# Patient Record
Sex: MALE | Race: WHITE | URBAN - METROPOLITAN AREA
[De-identification: names, ages, dates, MRNs, and addresses within clinical notes are randomized per-mention and may not be internally consistent; named-entity substitution may affect disease eponyms.]

---

## 2023-12-04 ENCOUNTER — ORDER TRANSCRIPTION (OUTPATIENT)
Dept: PHYSICAL THERAPY | Facility: HOSPITAL | Age: 68
End: 2023-12-04

## 2023-12-04 DIAGNOSIS — R53.81 PHYSICAL DECONDITIONING: Primary | ICD-10-CM

## 2023-12-05 ENCOUNTER — ORDER TRANSCRIPTION (OUTPATIENT)
Dept: PHYSICAL THERAPY | Facility: HOSPITAL | Age: 68
End: 2023-12-05

## 2023-12-05 DIAGNOSIS — R53.1 WEAKNESS: Primary | ICD-10-CM

## 2023-12-08 ENCOUNTER — TELEPHONE (OUTPATIENT)
Dept: PHYSICAL THERAPY | Facility: HOSPITAL | Age: 68
End: 2023-12-08

## 2023-12-11 ENCOUNTER — OFFICE VISIT (OUTPATIENT)
Dept: PHYSICAL THERAPY | Age: 68
End: 2023-12-11
Attending: NURSE PRACTITIONER
Payer: MEDICARE

## 2023-12-11 DIAGNOSIS — R53.1 WEAKNESS: Primary | ICD-10-CM

## 2023-12-11 PROCEDURE — 97162 PT EVAL MOD COMPLEX 30 MIN: CPT

## 2023-12-11 PROCEDURE — 97110 THERAPEUTIC EXERCISES: CPT

## 2023-12-12 ENCOUNTER — TELEPHONE (OUTPATIENT)
Dept: PHYSICAL THERAPY | Facility: HOSPITAL | Age: 68
End: 2023-12-12

## 2023-12-19 ENCOUNTER — OFFICE VISIT (OUTPATIENT)
Dept: PHYSICAL THERAPY | Age: 68
End: 2023-12-19
Attending: NURSE PRACTITIONER
Payer: MEDICARE

## 2023-12-19 PROCEDURE — 97110 THERAPEUTIC EXERCISES: CPT

## 2023-12-19 NOTE — PROGRESS NOTES
Dx: Weakness (R53.1)      Physical deconditioning (R53.81)          Insurance (Authorized # of Visits):  [employer]   [address] visits: 15   Authorizing Physician: Dr. Yusuf Patterson MD visit:   Fall Risk: standard         Precautions: TEVAR 11/2/23, carotid bypass July 2023, R hematoma armpit           Subjective: Pt reports feeling ok this morning. Pt reports that last week he \"tweeked\" his R ankle stepping in a pothole while he was out for a walk. He reports it's ok now. Pain level: 0/10  Objective: see flow sheet below  Initial vitals: BP: 166/98; after 2 min rest 156/89            HR: 60 bpm            SpO2: 95%  End of session: BP: 167/101                            HR: 68 bpm                            SpO2: 95%  RPE: 2/10    Assessment: Reviewed HEP and provided pt with additional scapular strengthening exercises. Goals:   Pt will be able to walk 2 miles or greater without rest breaks to be able to be independent in the community. Pt will be able to navigate 2 flights of stairs without rest to be able to navigate safely in his home. Pt will be able to lift 10lbs or greater overhead to be able to grab objects from cabinets at home. Pt will be able to carry 10lbs or greater up and down stairs without rest to move items up from the basement for household chores. Pt will increase SLS on L to 20 seconds or greater to be able to don pants safely in standing. Pt will increase mid/low trap strength to 5/5 to be able to stabilize trunk and UE with lifting and reaching for objects overhead. Plan: continue with scapular strengthening and aerobic exercise  Date: 12/19/2023  TX#: 2/12 Date:                 TX#: 3/ Date:                 TX#: 4/ Date:                 TX#: 5/ Date:    Tx#: 6/   There ex:  Nustep L3 x 10 min   Rows red TB 10x3  Ws red TB 10x3  Flex red TB 10x3  Step up w/hip drive 27Z5 R/L                            HEP: 12/19/23- flex red TB, Ws red TB     Charges: TEx3       Total Timed Treatment: 40 min  Total Treatment Time: 40 min

## 2024-01-02 ENCOUNTER — OFFICE VISIT (OUTPATIENT)
Dept: PHYSICAL THERAPY | Age: 69
End: 2024-01-02
Attending: NURSE PRACTITIONER
Payer: MEDICARE

## 2024-01-02 PROCEDURE — 97110 THERAPEUTIC EXERCISES: CPT

## 2024-01-02 NOTE — PROGRESS NOTES
Dx: Weakness (R53.1)      Physical deconditioning (R53.81)          Insurance (Authorized # of Visits):  [employer]   POC visits: 12   Authorizing Physician: Dr. Charles Patterson MD visit:   Fall Risk: standard         Precautions: TEVAR 11/2/23, carotid bypass July 2023, R hematoma armpit           Subjective: Pt reports that has noticed more stamina definitely.   Pain level: 0/10  Objective: see flow sheet below  Initial vitals: BP: 136/81            HR: 56 bpm            SpO2: 96%  End of session: BP: 162/95                            HR: 68 bpm                            SpO2: 95%  RPE: 2/10    Assessment: Progressed scapular and hip strengthening exercises and provided pt with HEP.       Goals:   Pt will be able to walk 2 miles or greater without rest breaks to be able to be independent in the community.  Pt will be able to navigate 2 flights of stairs without rest to be able to navigate safely in his home.   Pt will be able to lift 10lbs or greater overhead to be able to grab objects from cabinets at home.   Pt will be able to carry 10lbs or greater up and down stairs without rest to move items up from the basement for household chores.   Pt will increase SLS on L to 20 seconds or greater to be able to don pants safely in standing.   Pt will increase mid/low trap strength to 5/5 to be able to stabilize trunk and UE with lifting and reaching for objects overhead.     Plan: continue with scapular strengthening and aerobic exercise  Date: 12/19/2023  TX#: 2/12 Date: 1/2/24       TX#: 3/12 Date:                 TX#: 4/ Date:                 TX#: 5/ Date:   Tx#: 6/   There ex:  Nustep L3 x 10 min   Rows red TB 10x3  Ws red TB 10x3  Flex red TB 10x3  Step up w/hip drive 10x2 R/L There ex:  Flex green TB 15x2  B ER green TB 15x2  Horiz abd green TB 15x2   Diagnols green TB 15x R/L  Step up w/hip drive 15x2 R/L  Hip abd red TB 15x2 R/L  Hip ext red TB 15x2 R/L                           HEP: 12/19/23- flex red TB, Ws  red TB   1/2/24- B ER/horiz abd/diagnols- green TB, hip abd/ext    Charges: TEx3       Total Timed Treatment: 40 min  Total Treatment Time: 40 min

## 2024-01-04 ENCOUNTER — OFFICE VISIT (OUTPATIENT)
Dept: PHYSICAL THERAPY | Age: 69
End: 2024-01-04
Attending: NURSE PRACTITIONER
Payer: MEDICARE

## 2024-01-04 PROCEDURE — 97110 THERAPEUTIC EXERCISES: CPT

## 2024-01-04 NOTE — PROGRESS NOTES
Dx: Weakness (R53.1)      Physical deconditioning (R53.81)          Insurance (Authorized # of Visits):  [employer]   POC visits: 12   Authorizing Physician: Dr. Charles Patterson MD visit:   Fall Risk: standard         Precautions: TEVAR 11/2/23, carotid bypass July 2023, R hematoma armpit           Subjective: Pt reports no complaints.  Pt reports that he walked 4 miles yesterday, and was tired but not not SOB. He reports most of his SOB symptoms occur when he has to use quick bursts of energy such as going up the stairs.   Pain level: 0/10  Objective: see flow sheet below  Initial vitals: not taken this session, pt reports BP at home was 140/82 this morning  End of session: BP: 156/92                            HR: 71 bpm                            SpO2: 97%  RPE: 2/10 end of session    Assessment: Initiated exercises on compliant surface to improve postural stability and pulley machine to further progress scapular strength for OH lifting.       Goals:   Pt will be able to walk 2 miles or greater without rest breaks to be able to be independent in the community.  Pt will be able to navigate 2 flights of stairs without rest to be able to navigate safely in his home.   Pt will be able to lift 10lbs or greater overhead to be able to grab objects from cabinets at home.   Pt will be able to carry 10lbs or greater up and down stairs without rest to move items up from the basement for household chores.   Pt will increase SLS on L to 20 seconds or greater to be able to don pants safely in standing.   Pt will increase mid/low trap strength to 5/5 to be able to stabilize trunk and UE with lifting and reaching for objects overhead.     Plan: continue with strengthening to improve activity tolerance  Date: 12/19/2023  TX#: 2/12 Date: 1/2/24       TX#: 3/12 Date:  1/4/24             TX#: 4/12 Date:                 TX#: 5/ Date:   Tx#: 6/   There ex:  Nustep L3 x 10 min   Rows red TB 10x3  Ws red TB 10x3  Flex red TB  10x3  Step up w/hip drive 10x2 R/L There ex:  Flex green TB 15x2  B ER green TB 15x2  Horiz abd green TB 15x2   Diagnols green TB 15x R/L  Step up w/hip drive 15x2 R/L  Hip abd red TB 15x2 R/L  Hip ext red TB 15x2 R/L There ex:  Nustep L3 x 10 min   Hip abd red TB 15x2 R/L  Hip ext red TB 15x2 R/L  Hip flex/knee flex red TB 15x2 R/L  Pulley machine rows 3# 15x2  Pulley machine Ws 3# 15x2       NMRE:  SLS airex w/hip flex/abd/ext 10x R/L                   HEP: 12/19/23- flex red TB, Ws red TB   1/2/24- B ER/horiz abd/diagnols- green TB, hip abd/ext- red TB    Charges: TEx3(40 min)      Total Timed Treatment: 40 min  Total Treatment Time: 40 min

## 2024-01-08 ENCOUNTER — OFFICE VISIT (OUTPATIENT)
Dept: PHYSICAL THERAPY | Age: 69
End: 2024-01-08
Attending: NURSE PRACTITIONER
Payer: MEDICARE

## 2024-01-08 PROCEDURE — 97110 THERAPEUTIC EXERCISES: CPT

## 2024-01-08 NOTE — PROGRESS NOTES
Dx: Weakness (R53.1)      Physical deconditioning (R53.81)          Insurance (Authorized # of Visits):  [employer]   POC visits: 12   Authorizing Physician: Dr. Charles Patterson MD visit:   Fall Risk: standard         Precautions: TEVAR 11/2/23, carotid bypass July 2023, R hematoma armpit           Subjective: Patient reports he is walking 4 miles consistently; he is not tired after 4 miles even after his exercises.  He is planning on increasing to 5 miles.  He did go up and down the stairs a lot of weekend with moving [place] stuff; he gets tired fast with the stairs and this is still relatively tiring.    Pain level: 0/10  Objective: see flow sheet below  Beginning of session: BP: 155/91                            HR: 64 bpm                            SpO2: 96%      End of session: BP: 52416                            HR: 83 bpm                            SpO2: 97%  RPE: 4/10 end of session    Assessment: Continued with LE strengthening and balance as well as UE strength to help manage reaching and prolonged LE use. Also progressed with step ups this session to promote increased functional strength as needed for stairs.  Need to continue with dynamic strengthening to improve endurance for ADLS      Goals:   Pt will be able to walk 2 miles or greater without rest breaks to be able to be independent in the community.  MET  Pt will be able to navigate 2 flights of stairs without rest to be able to navigate safely in his home.   Pt will be able to lift 10lbs or greater overhead to be able to grab objects from cabinets at home.   Pt will be able to carry 10lbs or greater up and down stairs without rest to move items up from the basement for household chores.   Pt will increase SLS on L to 20 seconds or greater to be able to don pants safely in standing.   Pt will increase mid/low trap strength to 5/5 to be able to stabilize trunk and UE with lifting and reaching for objects overhead.     Plan: Continue with  strengthening to improve activity tolerance and endurance; progress with the BB next visit.  Date: 1/2/24       TX#: 3/12 Date:  1/4/24             TX#: 4/12 Date:   1/8/24              TX#: 5/12 Date:   Tx#: 6/   There ex:  Flex green TB 15x2  B ER green TB 15x2  Horiz abd green TB 15x2   Diagnols green TB 15x R/L  Step up w/hip drive 15x2 R/L  Hip abd red TB 15x2 R/L  Hip ext red TB 15x2 R/L There ex:  Nustep L3 x 10 min   Hip abd red TB 15x2 R/L  Hip ext red TB 15x2 R/L  Hip flex/knee flex red TB 15x2 R/L  Pulley machine rows 3# 15x2  Pulley machine Ws 3# 15x2 There ex:  Nustep L4 x 10 min   Hip abd red TB 15x2 R/L  Hip ext red TB 15x2 R/L  Hip flex red TB 15x2 R/L  Pulley machine rows 7# 15x2     Step up with hip flexion x 10 R/L at stairs         NMRE:  SLS airex w/hip flex/abd/ext 10x R/L NMRE:  SLS airex w/hip flex/abd/ext 10x R/L                HEP: 12/19/23- flex red TB, Ws red TB   1/2/24- B ER/horiz abd/diagnols- green TB, hip abd/ext- red TB    Charges: Ther ex x 3 (39 min)      Total Timed Treatment: 41 min  Total Treatment Time: 42 min

## 2024-01-10 ENCOUNTER — OFFICE VISIT (OUTPATIENT)
Dept: PHYSICAL THERAPY | Age: 69
End: 2024-01-10
Attending: NURSE PRACTITIONER
Payer: MEDICARE

## 2024-01-10 PROCEDURE — 97110 THERAPEUTIC EXERCISES: CPT

## 2024-01-10 NOTE — PROGRESS NOTES
Dx: Weakness (R53.1)      Physical deconditioning (R53.81)          Insurance (Authorized # of Visits):  [employer]   POC visits: 12   Authorizing Physician: Dr. Charles Patterson MD visit:   Fall Risk: standard         Precautions: TEVAR 11/2/23, carotid bypass July 2023, R hematoma armpit           Subjective: Patient reports he was not sore after the last session, but he had some fatigue in the hips. He walked 5 miles later that day and he did not have a problem with the progression and he did not need a break. He has less fatigue with repeated reaching overhead.  Pain level: 0/10  Objective: see flow sheet below  Beginning of session: BP: 138/81 - taken at home                            HR:  81 bpm                            SpO2: 95%      End of session: BP: 154/90                            HR: 76 bpm                            SpO2: 98%  RPE: 4/10 end of session    Assessment: Progressed with the BB this visit to help promote increased strength and proprioception as his walking and standing endurances improve. Also progressed with sit to stand reps this session to work on his LE strenght in [facility] and his CV endurance; needs reinforcement.    Goals:   Pt will be able to walk 2 miles or greater without rest breaks to be able to be independent in the community.  MET  Pt will be able to navigate 2 flights of stairs without rest to be able to navigate safely in his home.   Pt will be able to lift 10 lbs or greater overhead to be able to grab objects from cabinets at home.   Pt will be able to carry 10 lbs or greater up and down stairs without rest to move items up from the basement for household chores.   Pt will increase SLS on L to 20 seconds or greater to be able to don pants safely in standing.   Pt will increase mid/low trap strength to 5/5 to be able to stabilize trunk and UE with lifting and reaching for objects overhead.     Plan: Continue with strengthening to improve activity tolerance and endurance;  progress with increased time on the BB next time and add the monster walks to additional functional strength.  Date: 1/2/24       TX#: 3/12 Date:  1/4/24             TX#: 4/12 Date:   1/8/24              TX#: 5/12 Date: 1/10/24  Tx#: 6/12   There ex:  Flex green TB 15x2  B ER green TB 15x2  Horiz abd green TB 15x2   Diagnols green TB 15x R/L  Step up w/hip drive 15x2 R/L  Hip abd red TB 15x2 R/L  Hip ext red TB 15x2 R/L There ex:  Nustep L3 x 10 min   Hip abd red TB 15x2 R/L  Hip ext red TB 15x2 R/L  Hip flex/knee flex red TB 15x2 R/L  Pulley machine rows 3# 15x2  Pulley machine Ws 3# 15x2 There ex:  Nustep L4 x 10 min   Hip abd red TB 15x2 R/L  Hip ext red TB 15x2 R/L  Hip flex red TB 15x2 R/L  Pulley machine rows 7# 15x2     Step up with hip flexion x 10 R/L at stairs     There ex:  Nustep L4 x 10 min   Hip abd red TB 15x2 R/L  Hip ext red TB 15x2 R/L  Hip flex red TB 15x2 R/L  Pulley machine rows 7# 15x2     Step up with hip flexion x 15 R/L at stairs    NMRE:  SLS Airex w/hip flex/abd/ext 10 x R/L NMRE:  SLS Airex w/hip flex/abd/ext 10 x R/L NMRE:  SLS Airex w/hip flex/abd/ext 10 x R/L  BB center balance x 1 min                HEP: 12/19/23- flex red TB, Ws red TB   1/2/24- B ER/horiz abd/diagnols- green TB, hip abd/ext- red TB    Charges: Ther ex x 3 (39 min)      Total Timed Treatment: 42 min  Total Treatment Time: 42 min

## 2024-01-15 ENCOUNTER — OFFICE VISIT (OUTPATIENT)
Dept: PHYSICAL THERAPY | Age: 69
End: 2024-01-15
Attending: NURSE PRACTITIONER
Payer: MEDICARE

## 2024-01-15 PROCEDURE — 97110 THERAPEUTIC EXERCISES: CPT

## 2024-01-15 NOTE — PROGRESS NOTES
Dx: Weakness (R53.1)      Physical deconditioning (R53.81)          Insurance (Authorized # of Visits):  [employer]   POC visits: 12   Authorizing Physician: Dr. Charles Patterson MD visit:   Fall Risk: standard         Precautions: TEVAR 11/2/23, carotid bypass July 2023, R hematoma armpit           Subjective: Patient reports he has ridden the stationary bike a little bit more since the weather has turned colder and he is walking less outside.  He is feeling that he is getting a little bit stronger.  Pain level: 0/10  Objective: see flow sheet below  Beginning of session: BP: 140/83 - taken at home                            HR:  55 bpm                            SpO2: 96%      End of session: BP: 170/100 - after 2 min 153/94                            HR: 74 bpm                            SpO2: 98%  RPE: 3/10 end of session    Assessment: Continued to work on progressive strengthening this visit to help improve his strength and endurance for gait and stairs  He continues to need increased strength and balance to improve his CV safety with all ADLS.     Goals:   Pt will be able to walk 2 miles or greater without rest breaks to be able to be independent in the community.  MET  Pt will be able to navigate 2 flights of stairs without rest to be able to navigate safely in his home.   Pt will be able to lift 10 lbs or greater overhead to be able to grab objects from cabinets at home.   Pt will be able to carry 10 lbs or greater up and down stairs without rest to move items up from the basement for household chores.   Pt will increase SLS on L to 20 seconds or greater to be able to don pants safely in standing.   Pt will increase mid/low trap strength to 5/5 to be able to stabilize trunk and UE with lifting and reaching for objects overhead.     Plan: Continue with strengthening to improve activity tolerance and endurance; progress with increased time on the BB and progress with squats next visit.  Date:  1/4/24              TX#: 4/12 Date:   1/8/24              TX#: 5/12 Date: 1/10/24  Tx#: 6/12 Date: 1/15/24  Tx#: 7/12   There ex:  Nustep L3 x 10 min   Hip abd red TB 15x2 R/L  Hip ext red TB 15x2 R/L  Hip flex/knee flex red TB 15x2 R/L  Pulley machine rows 3# 15x2  Pulley machine Ws 3# 15x2 There ex:  Nustep L4 x 10 min   Hip abd red TB 15x2 R/L  Hip ext red TB 15x2 R/L  Hip flex red TB 15x2 R/L  Pulley machine rows 7# 15x2     Step up with hip flexion x 10 R/L at stairs     There ex:  Nustep L4 x 10 min   Hip abd red TB 15x2 R/L  Hip ext red TB 15x2 R/L  Hip flex red TB 15x2 R/L  Pulley machine rows 7# 15x2     Step up with hip flexion x 15 R/L at stairs There ex:  Nustep L4 x 10 min   Hip abd red TB 15x2 R/L  Hip ext red TB 15x2 R/L  Hip flex red TB 15x2 R/L  Pulley machine rows 7# 15x2     Step up with hip flexion x 15 R/L at stairs    Monster walk 3 laps RTB   NMRE:  SLS Airex w/hip flex/abd/ext 10 x R/L NMRE:  SLS Airex w/hip flex/abd/ext 10 x R/L NMRE:  SLS Airex w/hip flex/abd/ext 10 x R/L  BB center balance x 1 min  NMRE:  SLS Airex w/hip flex/abd/ext 10 x R/L  BB center balance x 1 min                HEP: 12/19/23- flex red TB, Ws red TB   1/2/24- B ER/horiz abd/diagnols- green TB, hip abd/ext- red TB    Charges: Ther ex x 3 (39 min)      Total Timed Treatment: 43 min  Total Treatment Time: 43 min

## 2024-01-17 ENCOUNTER — OFFICE VISIT (OUTPATIENT)
Dept: PHYSICAL THERAPY | Age: 69
End: 2024-01-17
Attending: NURSE PRACTITIONER
Payer: MEDICARE

## 2024-01-17 PROCEDURE — 97110 THERAPEUTIC EXERCISES: CPT

## 2024-01-17 NOTE — PROGRESS NOTES
Dx: Weakness (R53.1)      Physical deconditioning (R53.81)          Insurance (Authorized # of Visits):  [employer]   POC visits: 12   Authorizing Physician: Dr. Charles Patterson MD visit:   Fall Risk: standard         Precautions: TEVAR 11/2/23, carotid bypass July 2023, R hematoma armpit           Subjective: Patient reports he is planning on doing some walking later today; he did a lot of moving and carrying of boxes into the attic. He states that he was a little bit tight in the left groin following the last session, but he did not have any restrictions with walking due to pain. His exercises at home have been going ok.  Pain level: 0/10  Objective: see flow sheet below  Beginning of session: BP: 117/70 - taken at home                            HR:  57 bpm                            SpO2: 98%      End of session: BP: 175/199 - after 2 min 158/94                            HR: 72 bpm                            SpO2: 97%  RPE: 3/10 end of session    Assessment: Progressed this visit with additional time on the BB, squats and also increased resistance with the rows.  He does need fewer rest breaks during interventions as his strength and endurance are improving; needs reinforcement.    Goals:   Pt will be able to walk 2 miles or greater without rest breaks to be able to be independent in the community.  MET  Pt will be able to navigate 2 flights of stairs without rest to be able to navigate safely in his home.   Pt will be able to lift 10 lbs or greater overhead to be able to grab objects from cabinets at home.   Pt will be able to carry 10 lbs or greater up and down stairs without rest to move items up from the basement for household chores.   Pt will increase SLS on L to 20 seconds or greater to be able to don pants safely in standing.   Pt will increase mid/low trap strength to 5/5 to be able to stabilize trunk and UE with lifting and reaching for objects overhead.     Plan: Continue with strengthening to  improve activity tolerance and endurance; progress with additional strength for better force generation with balance with D2 flexion of the UE  Date: 1/10/24  Tx#: 6/12 Date: 1/15/24  Tx#: 7/12 Date: 1/17/24  Tx#: 8/12   There ex:  Nustep L4 x 10 min   Hip abd red TB 15x2 R/L  Hip ext red TB 15x2 R/L  Hip flex red TB 15x2 R/L  Pulley machine rows 7# 15x2     Step up with hip flexion x 15 R/L at stairs There ex:  Nustep L4 x 10 min   Hip abd red TB 15x2 R/L  Hip ext red TB 15x2 R/L  Hip flex red TB 15x2 R/L  Pulley machine rows 7# 15x2     Step up with hip flexion x 15 R/L at stairs    Monster walk 3 laps RTB There ex:  Nustep L4 x 10 min   Hip abd red TB 15x2 R/L  Hip ext red TB 15x2 R/L  Hip flex red TB 15x2 R/L  Pulley machine rows 10# 15x2     Step up with hip flexion x 15 R/L at stairs    Monster walk 3 laps RTB  Chair squats 3 x 5   NMRE:  SLS Airex w/hip flex/abd/ext 10 x R/L  BB center balance x 1 min  NMRE:  SLS Airex w/hip flex/abd/ext 10 x R/L  BB center balance x 1 min  NMRE:  SLS Airex w/hip flex/abd/ext 10 x R/L  BB center balance x 2 min              HEP: 12/19/23- flex red TB, Ws red TB   1/2/24- B ER/horiz abd/diagonals- green TB, hip abd/ext- red TB  1/17/24 - monster walks and chair squats    Charges: Ther ex x 3 (38 min)      Total Timed Treatment: 43 min  Total Treatment Time: 43 min

## 2024-01-22 ENCOUNTER — OFFICE VISIT (OUTPATIENT)
Dept: PHYSICAL THERAPY | Age: 69
End: 2024-01-22
Attending: NURSE PRACTITIONER
Payer: MEDICARE

## 2024-01-22 PROCEDURE — 97110 THERAPEUTIC EXERCISES: CPT

## 2024-01-22 NOTE — PROGRESS NOTES
Dx: Weakness (R53.1)      Physical deconditioning (R53.81)          Insurance (Authorized # of Visits):  [employer]   POC visits: 12   Authorizing Physician: Dr. Charles Patterson MD visit:   Fall Risk: standard         Precautions: TEVAR 11/2/23, carotid bypass July 2023, R hematoma armpit           Subjective: Patient reports he he got out to walk 5 miles once since the last visit because he was doing some home projects and also using his .  HE feels that he can start walking some hills as the weather improves.    Pain level: 0/10  Objective: see flow sheet below  Beginning of session: BP: 147/89                             HR:  58 bpm                            SpO2: 98%      End of session: BP: 183/102  - after 2 min 156/89                            HR: 72 bpm                            SpO2: 97%  RPE: 4/10 end of session    Assessment: The patient requires fewer rest breaks during standing strengthening exercises this session. He also has less LOB during balance exercises as his strenght and proprioception have improved which will translate to increased endurance with standing activities, stairs and walking.  Need to continue with additional strengthening and flexibility to help promote increased strength to help increased his functional endurance.    Goals:   Pt will be able to walk 2 miles or greater without rest breaks to be able to be independent in the community.  MET  Pt will be able to navigate 2 flights of stairs without rest to be able to navigate safely in his home.   Pt will be able to lift 10 lbs or greater overhead to be able to grab objects from cabinets at home.   Pt will be able to carry 10 lbs or greater up and down stairs without rest to move items up from the basement for household chores.   Pt will increase SLS on L to 20 seconds or greater to be able to don pants safely in standing.   Pt will increase mid/low trap strength to 5/5 to be able to stabilize trunk and UE with  lifting and reaching for objects overhead.     Plan: Continue with strengthening to improve activity tolerance and endurance; progress with additional strength with bosu step up and lateral walks in the next few sessions.   Date: 1/10/24  Tx#: 6/12 Date: 1/15/24  Tx#: 7/12 Date: 1/17/24  Tx#: 8/12 Date: 1/22/24  Tx#: 9/12   There ex:  Nustep L4 x 10 min   Hip abd red TB 15x2 R/L  Hip ext red TB 15x2 R/L  Hip flex red TB 15x2 R/L  Pulley machine rows 7# 15x2     Step up with hip flexion x 15 R/L at stairs There ex:  Nustep L4 x 10 min   Hip abd red TB 15x2 R/L  Hip ext red TB 15x2 R/L  Hip flex red TB 15x2 R/L  Pulley machine rows 7# 15x2     Step up with hip flexion x 15 R/L at stairs    Monster walk 3 laps RTB There ex:  Nustep L4 x 10 min   Hip abd red TB 15x2 R/L  Hip ext red TB 15x2 R/L  Hip flex red TB 15x2 R/L  Pulley machine rows 10# 15x2     Step up with hip flexion x 15 R/L at stairs    Monster walk 3 laps RTB  Chair squats 3 x 5 There ex:  Nustep L4 x 10 min   Hip abd red TB 15x2 R/L  Hip ext red TB 15x2 R/L  Hip flex red TB 15x2 R/L  Pulley machine rows 10# 15x2     Step up with hip flexion x 15 R/L at stairs    Monster walk 2 laps RTB  D2 flexion standing on Airex in [place] 2 x 10  Chair squats 2 x 10   NMRE:  SLS Airex w/hip flex/abd/ext 10 x R/L  BB center balance x 1 min  NMRE:  SLS Airex w/hip flex/abd/ext 10 x R/L  BB center balance x 1 min  NMRE:  SLS Airex w/hip flex/abd/ext 10 x R/L  BB center balance x 2 min  NMRE:  SLS Airex w/hip flex/abd/ext 10 x R/L  BB center balance x 2 min                HEP: 12/19/23- flex red TB, Ws red TB   1/2/24- B ER/horiz abd/diagonals- green TB, hip abd/ext- red TB  1/17/24 - monster walks and chair squats    Charges: Ther ex x 3 (38 min)      Total Timed Treatment: 43 min  Total Treatment Time: 43 min

## 2024-01-26 ENCOUNTER — OFFICE VISIT (OUTPATIENT)
Dept: PHYSICAL THERAPY | Age: 69
End: 2024-01-26
Attending: NURSE PRACTITIONER
Payer: MEDICARE

## 2024-01-26 PROCEDURE — 97110 THERAPEUTIC EXERCISES: CPT

## 2024-01-26 NOTE — PROGRESS NOTES
Progress/Discharge Summary  Pt has attended 10 visits in Physical Therapy.     Dx: Weakness (R53.1)      Physical deconditioning (R53.81)          Insurance (Authorized # of Visits):  [employer] Medicare   POC visits: 12   Authorizing Physician: Dr. Charles Patterson MD visit:   Fall Risk: standard         Precautions: TEVAR 11/2/23, carotid bypass July 2023, R hematoma armpit           Subjective: Patient reports improvement in his strength and endurance sicne beginning physical therapy following TEVAR causing deconditioning.  HE does think that his strenght has improved with therapy and he has increased his standing and walking endurances. The therapy has helped him have more guidance on how to progress his strenght and endurance as well as improve his balance.  He has increased his walking endurance to 5 miles and he is less fatigued with stairs and does not need the same amount of rest breaks with activities.  He can lift and reach overhead and he has no fatigue with LE dressing.  He is still a little limited with squatting because that is a position he doesn't like more than he struggles with the motion.   Pain level: 0/10  Current functional limitations include stairs at home, rising from squatting/bending forward   Objective: see flow sheet below  Beginning of session: BP: 147/89                             HR:  54 bpm                            SpO2: 99%      End of session: BP: 170/95 - after 2 additional  min 150/91                            HR: 70 bpm                            SpO2: 99%  RPE: 3 10 end of session    Observation:Hematomas in the R axillary region and L medial biceps only slightly palpable but not visible or painful. He has a forward head, rounded shoulders, and level iliac crest.   Palpation: minimally palpable hematoma in the left medial biceps;  pain free.  No gross tenderness to palpation   Sensation: intact to light touch     AROM: (* denotes performed with pain)  Hip Knee Foot/Ankle   WFL  WFL    WFL     Shoulder  Elbow   WFL WFL         Strength/MMT: (* denotes performed with pain)  Hip Knee Foot/Ankle   Flexion: R 5/5; L 5/5  Extension: R 4/5; L 4/5  Abduction: R 5/5; L 5/5  ER: R 5/5; L 5/5  IR: R 5/5; L 5/5  Glut med: R 5/5, L 5/5 Flexion: R 5/5; L 5/5  Extension: R 5/5; L 5/5    DF: R 5/5; L 5/5  PF: R 5/5; L 5/5  INV: R 5/5; L 5/5  EV: R 5/5; L 5/5           Strength/MMT: (* denotes performed with pain)  Shoulder Elbow Scapular   Flexion: R 5/5; L 5/5  Abduction: R 5/5; L 5/5  ER: R 5/5; L 5/5  IR: R 5/5; L 5/5 Flexion: R 5/5; L 5/5  Extension: R 5/5; L 5/5  Wrist flexion: R 5/5; L 5/5  Wrist Extension: R 5/5; L 5/5  Mid trap: R 4/5; L 4/5   Low trap: R 4-/5; L 4-/5         Gait: pt ambulates on level ground with normal mechanics  Balance: SLS: R >30 sec, L 28 sec      Assessment: Patient has made significant gains in his strenght sicne beginning physical therapy allow him increased muscular strength and endurance during ADLS.  Having improved strenght and conditioning will help to decrease CV stresses during ADLS.  He has also improved his balance which will improve his overall stability during gait, stairs and squatting. He is independent in a progressive HEP and is appropriate to DC after 2 more session with independent progression with his HEP.      Goals:   Pt will be able to walk 2 miles or greater without rest breaks to be able to be independent in the community.  MET  Pt will be able to navigate 2 flights of stairs without rest to be able to navigate safely in his home.   MET  Pt will be able to lift 10 lbs or greater overhead to be able to grab objects from cabinets at home. MET  Pt will be able to carry 10 lbs or greater up and down stairs without rest to move items up from the basement for household chores.  PARTIALLY MET  Pt will increase SLS on L to 20 seconds or greater to be able to don pants safely in standing.  MET  Pt will increase mid/low trap strength to 5/5 to be able to  stabilize trunk and UE with lifting and reaching for objects overhead. PARTIALLY MET        Plan: Continue skilled Physical Therapy 2 x/week or a total of 2 more visits over a 90 day period. Treatment will include: manual therapy, range of motion exercises, flexibility exercises, strengthening exercises, postural re-ed, neuromuscular re-education, CKC exercises, balance exercises, and HEP instruction all  to improve her functional independence         Patient/Family/Caregiver was advised of these findings, precautions, and treatment options and has agreed to actively participate in planning and for this course of care.    Thank you for your referral. If you have any questions, please contact me at Dept: [phone].    Sincerely,  Electronically signed by therapist: Lucita Roth PT     Physician's certification required:  Yes  Please co-sign or sign and return this letter via fax as soon as possible to [phone].   I certify the need for these services furnished under this plan of treatment and while under my care.    X___________________________________________________ Date____________________    Certification From: 1/26/2024  To:4/25/2024     Plan: Continue with strengthening to improve activity tolerance and endurance; progress with additional strength with bosu step up and lateral walks in the next few sessions.   Date: 1/17/24  Tx#: 8/12 Date: 1/22/24  Tx#: 9/12 Date: 1/26/24  Tx#: 10/12   There ex:  Nustep L4 x 10 min   Hip abd red TB 15x2 R/L  Hip ext red TB 15x2 R/L  Hip flex red TB 15x2 R/L  Pulley machine rows 10# 15x2     Step up with hip flexion x 15 R/L at stairs    Monster walk 3 laps RTB  Chair squats 3 x 5 There ex:  Nustep L4 x 10 min   Hip abd red TB 15x2 R/L  Hip ext red TB 15x2 R/L  Hip flex red TB 15x2 R/L  Pulley machine rows 10# 15x2     Step up with hip flexion x 15 R/L at stairs    Monster walk 2 laps RTB  D2 flexion standing on Airex in [place] 2 x 10  Chair squats 2 x 10  There ex:  Nustep L4 x 10 min   Hip abd red TB 15x2 R/L  Hip ext red TB 15x2 R/L  Hip flex red TB 15x2 R/L  Pulley machine rows 13# 15x2     Step up with hip flexion x 15 R/L at stairs    Monster walk 2 laps RTB  Lateral walks 2 laps RTB   D2 flexion standing on Airex in [place] 2 x 10 2#  Chair squats x 20   NMRE:  SLS Airex w/hip flex/abd/ext 10 x R/L  BB center balance x 2 min  NMRE:  SLS Airex w/hip flex/abd/ext 10 x R/L  BB center balance x 2 min  NMRE:  SLS Airex w/hip flex/abd/ext 10 x R/L  BB center balance x 2 min              HEP: 12/19/23- flex red TB, Ws red TB   1/2/24- B ER/horiz abd/diagonals- green TB, hip abd/ext- red TB  1/17/24 - monster walks and chair squats    Charges: Ther ex x 3 (39 min)      Total Timed Treatment: 44 min  Total Treatment Time: 44 min

## 2024-01-29 ENCOUNTER — OFFICE VISIT (OUTPATIENT)
Dept: PHYSICAL THERAPY | Age: 69
End: 2024-01-29
Attending: NURSE PRACTITIONER
Payer: MEDICARE

## 2024-01-29 PROCEDURE — 97110 THERAPEUTIC EXERCISES: CPT

## 2024-01-29 NOTE — PROGRESS NOTES
Dx: Weakness (R53.1)      Physical deconditioning (R53.81)          Insurance (Authorized # of Visits):  [employer] Medicare   POC visits: 12   Authorizing Physician: Dr. Charles Patterson MD visit:   Fall Risk: standard         Precautions: TEVAR 11/2/23, carotid bypass July 2023, R hematoma armpit           Subjective: Patient reports that he walked Saturday for 5 miles and he has good afterwards with his endurance and he kerns snot need many rest breaks.  He does feel a little bit of heaviness in the legs today overall.   Pain level: 0/10  Current functional limitations include stairs at home, rising from squatting/bending forward   Objective: see flow sheet below  Beginning of session: BP: 131/82 - measured at home                            HR:  57 bpm                            SpO2: 98%      End of session: BP: 163/95 - after 2 additional  min 159/89                            HR: 72 bpm                            SpO2: 98%  RPE: 3/10 end of session      Assessment: Continue with progressive strengthening to help improve his LE strength and endurance as needed to improve his functional strenght in CK for standing and walking. Gains in his balance will help to improve his gait mechanics and his endurance for increased walking distance and stability on all variable terrain.       Goals:   Pt will be able to walk 2 miles or greater without rest breaks to be able to be independent in the community.  MET  Pt will be able to navigate 2 flights of stairs without rest to be able to navigate safely in his home.   MET  Pt will be able to lift 10 lbs or greater overhead to be able to grab objects from cabinets at home. MET  Pt will be able to carry 10 lbs or greater up and down stairs without rest to move items up from the basement for household chores.  PARTIALLY MET  Pt will increase SLS on L to 20 seconds or greater to be able to don pants safely in standing.  MET  Pt will increase mid/low trap strength to 5/5 to be able to  stabilize trunk and UE with lifting and reaching for objects overhead. PARTIALLY MET      Plan: Continue with strengthening to improve activity tolerance for one more session and then progress to DC.  Date: 1/17/24  Tx#: 8/12 Date: 1/22/24  Tx#: 9/12 Date: 1/26/24  Tx#: 10/12 Date: 1/29/24  Tx#: 11/12   There ex:  Nustep L4 x 10 min   Hip abd red TB 15x2 R/L  Hip ext red TB 15x2 R/L  Hip flex red TB 15x2 R/L  Pulley machine rows 10# 15x2     Step up with hip flexion x 15 R/L at stairs    Monster walk 3 laps RTB  Chair squats 3 x 5 There ex:  Nustep L4 x 10 min   Hip abd red TB 15x2 R/L  Hip ext red TB 15x2 R/L  Hip flex red TB 15x2 R/L  Pulley machine rows 10# 15x2     Step up with hip flexion x 15 R/L at stairs    Monster walk 2 laps RTB  D2 flexion standing on Airex in [place] 2 x 10  Chair squats 2 x 10 There ex:  Nustep L4 x 10 min   Hip abd red TB 15x2 R/L  Hip ext red TB 15x2 R/L  Hip flex red TB 15x2 R/L  Pulley machine rows 13# 15x2     Step up with hip flexion x 15 R/L at stairs    Monster walk 2 laps RTB  Lateral walks 2 laps RTB   D2 flexion standing on Airex in [place] 2 x 10 2#  Chair squats x 20 There ex:  Nustep L4 x 10 min   Hip abd red TB 15x2 R/L  Hip ext red TB 15x2 R/L  Hip flex red TB 15x2 R/L  Pulley machine rows 13# 15x2     Step up with hip flexion x 15 R/L at stairs    Monster walk 2 laps RTB  Lateral walks 2 laps RTB   D2 flexion standing on Airex in [place] 2 x 10 2#  Chair squats x 20   NMRE:  SLS Airex w/hip flex/abd/ext 10 x R/L  BB center balance x 2 min  NMRE:  SLS Airex w/hip flex/abd/ext 10 x R/L  BB center balance x 2 min  NMRE:  SLS Airex w/hip flex/abd/ext 10 x R/L  BB center balance x 2 min  NMRE:  SLS Airex w/hip flex/abd/ext 10 x R/L  BB center balance x 2 min                HEP: 12/19/23- flex red TB, Ws red TB   1/2/24- B ER/horiz abd/diagonals- green TB, hip abd/ext- red TB  1/17/24 - monster walks and chair squats    Charges: Ther ex x 3 (39 min)      Total Timed  Treatment: 44 min  Total Treatment Time: 44 min

## 2024-01-31 ENCOUNTER — APPOINTMENT (OUTPATIENT)
Dept: PHYSICAL THERAPY | Age: 69
End: 2024-01-31
Attending: NURSE PRACTITIONER
Payer: MEDICARE

## 2024-02-01 ENCOUNTER — OFFICE VISIT (OUTPATIENT)
Dept: PHYSICAL THERAPY | Age: 69
End: 2024-02-01
Attending: NURSE PRACTITIONER
Payer: MEDICARE

## 2024-02-01 PROCEDURE — 97110 THERAPEUTIC EXERCISES: CPT

## 2024-02-01 NOTE — PROGRESS NOTES
Discharge Summary  Pt has attended 12 visits in Physical Therapy.        Dx: Weakness (R53.1)      Physical deconditioning (R53.81)          Insurance (Authorized # of Visits):  [employer] Medicare   POC visits: 12   Authorizing Physician: Dr. Charles Patterson MD visit:   Fall Risk: standard         Precautions: TEVAR 11/2/23, carotid bypass July 2023, R hematoma armpit           Subjective: Patient reports he has been able to go up and down stairs with reciprocal pattern. He feels SOB when he gets up fast from squatting to floor or from bending forward. He tends to hold his breath when he bends forward. He denies any dizziness. He doesn't require rest breaks with walking 5 miles and doesn't get SOB with carrying 30 lbs up and down stairs. He feels that he has much improved endurance with all ADLS and confident with how to push his activities.He feels confident in continuing HEP independently and this visit being his last.   Pain level: 0/10  Current functional limitations include stairs at home, rising from squatting/bending forward   Objective: see flow sheet below  Beginning of session: BP: 125/76 - measured at home                            HR:  54 bpm                            SpO2: 98%      End of session: BP: 171/98 - after 2 additional  min 147/88                            HR: 75 bpm                            SpO2: 98%  RPE: 2/10 end of session      Assessment: Patient has improved strength and stability at the hip since his evaluation. He has improved balance with functional reaching and dynamic movements with his UE and LE. He has improved cardiovascular fitness since the initial evaluation due to decrease in RPE rating and recovery of blood pressure after 2 min rest. He has maximized gains with skilled physical therapy and will be discharged this visit with progressive HEP to maintain and progress the gains made in therapy.      Goals:   Pt will be able to walk 2 miles or greater without rest breaks to be  able to be independent in the community.  MET  Pt will be able to navigate 2 flights of stairs without rest to be able to navigate safely in his home.   MET  Pt will be able to lift 10 lbs or greater overhead to be able to grab objects from cabinets at home. MET  Pt will be able to carry 10 lbs or greater up and down stairs without rest to move items up from the basement for household chores. MET  Pt will increase SLS on L to 20 seconds or greater to be able to don pants safely in standing.  MET  Pt will increase mid/low trap strength to 5/5 to be able to stabilize trunk and UE with lifting and reaching for objects overhead. PARTIALLY MET      Plan: DC with HEP. Patient was instructed to follow up with their physician should an exacerbation of symptoms arise.      Patient/Family/Caregiver was advised of these findings, precautions, and treatment options and has agreed to actively participate in planning and for this course of care.    Thank you for your referral. If you have any questions, please contact me at Dept: [phone].    Sincerely,  Electronically signed by therapist: Lucita Roth PT     Physician's certification required:  No  Please co-sign or sign and return this letter via fax as soon as possible to [phone].   I certify the need for these services furnished under this plan of treatment and while under my care.    X___________________________________________________ Date____________________    Certification From: 2/1/2024  To:5/1/2024    Date: 1/22/24  Tx#: 9/12 Date: 1/26/24  Tx#: 10/12 Date: 1/29/24  Tx#: 11/12 Date: 2/1/24  Tx#: 12/12   There ex:  Nustep L4 x 10 min   Hip abd red TB 15x2 R/L  Hip ext red TB 15x2 R/L  Hip flex red TB 15x2 R/L  Pulley machine rows 10# 15x2     Step up with hip flexion x 15 R/L at stairs    Monster walk 2 laps RTB  D2 flexion standing on Airex in [place] 2 x 10  Chair squats 2 x 10 There ex:  Nustep L4 x 10 min   Hip abd red TB 15x2 R/L  Hip ext red  TB 15x2 R/L  Hip flex red TB 15x2 R/L  Pulley machine rows 13# 15x2     Step up with hip flexion x 15 R/L at stairs    Monster walk 2 laps RTB  Lateral walks 2 laps RTB   D2 flexion standing on Airex in [place] 2 x 10 2#  Chair squats x 20 There ex:  Nustep L4 x 10 min   Hip abd red TB 15x2 R/L  Hip ext red TB 15x2 R/L  Hip flex red TB 15x2 R/L  Pulley machine rows 13# 15x2     Step up with hip flexion x 15 R/L at stairs    Monster walk 2 laps RTB  Lateral walks 2 laps RTB   D2 flexion standing on Airex in [place] 2 x 10 2#  Chair squats x 20 There ex:  Nustep L4 x 10 min   Hip abd green TB 15x2 R/L  Hip ext green TB 15x2 R/L  Hip flex green TB 15x2 R/L  Pulley machine rows 13# 15x2     Step up with hip flexion x 15 R/L at stairs    Monster walk 2 laps RTB  Lateral walks 2 laps RTB   D2 flexion standing on Airex in [place] 2 x 10 2#  Chair squats x 20   NMRE:  SLS Airex w/hip flex/abd/ext 10 x R/L  BB center balance x 2 min  NMRE:  SLS Airex w/hip flex/abd/ext 10 x R/L  BB center balance x 2 min  NMRE:  SLS Airex w/hip flex/abd/ext 10 x R/L  BB center balance x 2 min  NMRE:  SLS Airex w/hip flex/abd/ext 10 x R/L  BB center balance x 2 min                HEP: 12/19/23- flex red TB, Ws red TB   1/2/24- B ER/horiz abd/diagonals- green TB, hip abd/ext- red TB  1/17/24 - monster walks and chair squats  2/1/24 - SLB with kicks fwd/side/back  Charges: Ther ex x 3 (40 min)      Total Timed Treatment: 45 min  Total Treatment Time: 45 min

## (undated) NOTE — LETTER
Patient Name: Gregorio Jimenez  YOB: 1955          MRN :  [id number]  Date:  1/29/2024  Referring Physician:  Eliza Soto    Progress/Discharge Summary  Pt has attended 10 visits in Physical Therapy.     Dx: Weakness (R53.1)      Physical deconditioning (R53.81)          Insurance (Authorized # of Visits):  [employer] Medicare   POC visits: 12   Authorizing Physician: Dr. Soto  Next MD visit:   Fall Risk: standard         Precautions: TEVAR 11/2/23, carotid bypass July 2023, R hematoma armpit           Subjective: Patient reports improvement in his strength and endurance sicne beginning physical therapy following TEVAR causing deconditioning.  HE does think that his strenght has improved with therapy and he has increased his standing and walking endurances. The therapy has helped him have more guidance on how to progress his strenght and endurance as well as improve his balance.  He has increased his walking endurance to 5 miles and he is less fatigued with stairs and does not need the same amount of rest breaks with activities.  He can lift and reach overhead and he has no fatigue with LE dressing.  He is still a little limited with squatting because that is a position he doesn't like more than he struggles with the motion.   Pain level: 0/10  Current functional limitations include stairs at home, rising from squatting/bending forward   Objective: see flow sheet below  Beginning of session: BP: 147/89                             HR:  54 bpm                            SpO2: 99%      End of session: BP: 170/95 - after 2 additional  min 150/91                            HR: 70 bpm                            SpO2: 99%  RPE: 3 10 end of session    Observation:Hematomas in the R axillary region and L medial biceps only slightly palpable but not visible or painful. He has a forward head, rounded shoulders, and level iliac crest.   Palpation: minimally palpable hematoma in the left medial biceps;  pain free.   No gross tenderness to palpation   Sensation: intact to light touch     AROM: (* denotes performed with pain)  Hip Knee Foot/Ankle   WFL WFL    WFL     Shoulder  Elbow   WFL WFL         Strength/MMT: (* denotes performed with pain)  Hip Knee Foot/Ankle   Flexion: R 5/5; L 5/5  Extension: R 4/5; L 4/5  Abduction: R 5/5; L 5/5  ER: R 5/5; L 5/5  IR: R 5/5; L 5/5  Glut med: R 5/5, L 5/5 Flexion: R 5/5; L 5/5  Extension: R 5/5; L 5/5    DF: R 5/5; L 5/5  PF: R 5/5; L 5/5  INV: R 5/5; L 5/5  EV: R 5/5; L 5/5           Strength/MMT: (* denotes performed with pain)  Shoulder Elbow Scapular   Flexion: R 5/5; L 5/5  Abduction: R 5/5; L 5/5  ER: R 5/5; L 5/5  IR: R 5/5; L 5/5 Flexion: R 5/5; L 5/5  Extension: R 5/5; L 5/5  Wrist flexion: R 5/5; L 5/5  Wrist Extension: R 5/5; L 5/5  Mid trap: R 4/5; L 4/5   Low trap: R 4-/5; L 4-/5         Gait: pt ambulates on level ground with normal mechanics  Balance: SLS: R >30 sec, L 28 sec      Assessment: Patient has made significant gains in his strenght sicne beginning physical therapy allow him increased muscular strength and endurance during ADLS.  Having improved strenght and conditioning will help to decrease CV stresses during ADLS.  He has also improved his balance which will improve his overall stability during gait, stairs and squatting. He is independent in a progressive HEP and is appropriate to DC after 2 more session with independent progression with his HEP.      Goals:   Pt will be able to walk 2 miles or greater without rest breaks to be able to be independent in the community.  MET  Pt will be able to navigate 2 flights of stairs without rest to be able to navigate safely in his home.   MET  Pt will be able to lift 10 lbs or greater overhead to be able to grab objects from cabinets at home. MET  Pt will be able to carry 10 lbs or greater up and down stairs without rest to move items up from the basement for household chores.  PARTIALLY MET  Pt will increase SLS on L  to 20 seconds or greater to be able to don pants safely in standing.  MET  Pt will increase mid/low trap strength to 5/5 to be able to stabilize trunk and UE with lifting and reaching for objects overhead. PARTIALLY MET        Plan: Continue skilled Physical Therapy 2 x/week or a total of 2 more visits over a 90 day period. Treatment will include: manual therapy, range of motion exercises, flexibility exercises, strengthening exercises, postural re-ed, neuromuscular re-education, CKC exercises, balance exercises, and HEP instruction all  to improve her functional independence         Patient/Family/Caregiver was advised of these findings, precautions, and treatment options and has agreed to actively participate in planning and for this course of care.    Thank you for your referral. If you have any questions, please contact me at Dept: [phone].    Sincerely,  Electronically signed by therapist: Lucita Roth PT     Physician's certification required:  Yes  Please co-sign or sign and return this letter via fax as soon as possible to [phone].   I certify the need for these services furnished under this plan of treatment and while under my care.    X___________________________________________________ Date____________________    Certification From: 1/26/2024  To:4/25/2024            21st Century Cures Act Notice to Patient: Medical documents like this are made available to patients in the interest of transparency. However, be advised this is a medical document and it is intended as [id number] communication between your medical providers. This medical document may contain abbreviations, assessments, medical data, and results or other terms that are unfamiliar. Medical documents are intended to carry relevant information, facts as evident, and the clinical opinion of the practitioner. As such, this medical document may be written in language that appears blunt or direct. You are encouraged to  contact your medical provider and/or [facility] if you have any questions about this medical document.

## (undated) NOTE — LETTER
Patient Name: Gregorio Jimenez  YOB: 1955          MRN :  [id number]  Date:  1/26/2024  Referring Physician:  Eliza Soto    Progress/Discharge Summary  Pt has attended 10 visits in Physical Therapy.     Dx: Weakness (R53.1)      Physical deconditioning (R53.81)          Insurance (Authorized # of Visits):  [employer]   POC visits: 12   Authorizing Physician: Dr. Soto  Next MD visit:   Fall Risk: standard         Precautions: TEVAR 11/2/23, carotid bypass July 2023, R hematoma armpit           Subjective: Patient reports improvement in his strength and endurance sicne beginning physical therapy following TEVAR causing deconditioning.  HE does think that his strenght has improved with therapy and he has increased his standing and walking endurances. The therapy has helped him have more guidance on how to progress his strenght and endurance as well as improve his balance.  He has increased his walking endurance to 5 miles and he is less fatigued with stairs and does not need the same amount of rest breaks with activities.  He can lift and reach overhead and he has no fatigue with LE dressing.  He is still a little limited with squatting because that is a position he doesn't like more than he struggles with the motion.   Pain level: 0/10  Current functional limitations include stairs at home, rising from squatting/bending forward   Objective: see flow sheet below  Beginning of session: BP: 147/89                             HR:  54 bpm                            SpO2: 99%      End of session: BP: 170/95 - after 2 additional  min 150/91                            HR: 70 bpm                            SpO2: 99%  RPE: 3 10 end of session    Observation:Hematomas in the R axillary region and L medial biceps only slightly palpable but not visible or painful. He has a forward head, rounded shoulders, and level iliac crest.   Palpation: minimally palpable hematoma in the left medial biceps;  pain free.   No gross tenderness to palpation   Sensation: intact to light touch     AROM: (* denotes performed with pain)  Hip Knee Foot/Ankle   WFL WFL    WFL     Shoulder  Elbow   WFL WFL         Strength/MMT: (* denotes performed with pain)  Hip Knee Foot/Ankle   Flexion: R 5/5; L 5/5  Extension: R 4/5; L 4/5  Abduction: R 5/5; L 5/5  ER: R 5/5; L 5/5  IR: R 5/5; L 5/5  Glut med: R 5/5, L 5/5 Flexion: R 5/5; L 5/5  Extension: R 5/5; L 5/5    DF: R 5/5; L 5/5  PF: R 5/5; L 5/5  INV: R 5/5; L 5/5  EV: R 5/5; L 5/5           Strength/MMT: (* denotes performed with pain)  Shoulder Elbow Scapular   Flexion: R 5/5; L 5/5  Abduction: R 5/5; L 5/5  ER: R 5/5; L 5/5  IR: R 5/5; L 5/5 Flexion: R 5/5; L 5/5  Extension: R 5/5; L 5/5  Wrist flexion: R 5/5; L 5/5  Wrist Extension: R 5/5; L 5/5  Mid trap: R 4/5; L 4/5   Low trap: R 4-/5; L 4-/5         Gait: pt ambulates on level ground with normal mechanics  Balance: SLS: R >30 sec, L 28 sec      Assessment: Patient has made significant gains in his strenght sicne beginning physical therapy allow him increased muscular strength and endurance during ADLS.  Having improved strenght and conditioning will help to decrease CV stresses during ADLS.  He has also improved his balance which will improve his overall stability during gait, stairs and squatting. He is independent in a progressive HEP and is appropriate to DC after 2 more session with independent progression with his HEP.      Goals:   Pt will be able to walk 2 miles or greater without rest breaks to be able to be independent in the community.  MET  Pt will be able to navigate 2 flights of stairs without rest to be able to navigate safely in his home.   MET  Pt will be able to lift 10 lbs or greater overhead to be able to grab objects from cabinets at home. MET  Pt will be able to carry 10 lbs or greater up and down stairs without rest to move items up from the basement for household chores.  PARTIALLY MET  Pt will increase SLS on L  to 20 seconds or greater to be able to don pants safely in standing.  MET  Pt will increase mid/low trap strength to 5/5 to be able to stabilize trunk and UE with lifting and reaching for objects overhead. PARTIALLY MET        Plan: Continue skilled Physical Therapy 2 x/week or a total of 2 more visits over a 90 day period. Treatment will include: manual therapy, range of motion exercises, flexibility exercises, strengthening exercises, postural re-ed, neuromuscular re-education, CKC exercises, balance exercises, and HEP instruction all  to improve her functional independence         Patient/Family/Caregiver was advised of these findings, precautions, and treatment options and has agreed to actively participate in planning and for this course of care.    Thank you for your referral. If you have any questions, please contact me at Dept: [phone].    Sincerely,  Electronically signed by therapist: Lucita Roth PT     Physician's certification required:  Yes  Please co-sign or sign and return this letter via fax as soon as possible to [phone].   I certify the need for these services furnished under this plan of treatment and while under my care.    X___________________________________________________ Date____________________    Certification From: 1/26/2024  To:4/25/2024            21st Century Cures Act Notice to Patient: Medical documents like this are made available to patients in the interest of transparency. However, be advised this is a medical document and it is intended as [id number] communication between your medical providers. This medical document may contain abbreviations, assessments, medical data, and results or other terms that are unfamiliar. Medical documents are intended to carry relevant information, facts as evident, and the clinical opinion of the practitioner. As such, this medical document may be written in language that appears blunt or direct. You are encouraged to  contact your medical provider and/or [facility] Experience if you have any questions about this medical document.